# Patient Record
Sex: MALE | Race: WHITE
[De-identification: names, ages, dates, MRNs, and addresses within clinical notes are randomized per-mention and may not be internally consistent; named-entity substitution may affect disease eponyms.]

---

## 2018-12-29 ENCOUNTER — HOSPITAL ENCOUNTER (EMERGENCY)
Dept: HOSPITAL 53 - M ED | Age: 23
Discharge: HOME | End: 2018-12-29
Payer: COMMERCIAL

## 2018-12-29 VITALS — DIASTOLIC BLOOD PRESSURE: 88 MMHG | SYSTOLIC BLOOD PRESSURE: 122 MMHG

## 2018-12-29 DIAGNOSIS — R10.9: Primary | ICD-10-CM

## 2018-12-29 DIAGNOSIS — Z88.1: ICD-10-CM

## 2018-12-29 DIAGNOSIS — Z88.2: ICD-10-CM

## 2018-12-29 DIAGNOSIS — Z79.899: ICD-10-CM

## 2018-12-29 LAB
ALBUMIN SERPL BCG-MCNC: 4.5 GM/DL (ref 3.2–5.2)
ALT SERPL W P-5'-P-CCNC: 23 U/L (ref 12–78)
BASOPHILS # BLD AUTO: 0 10^3/UL (ref 0–0.2)
BASOPHILS NFR BLD AUTO: 0.4 % (ref 0–1)
BILIRUB CONJ SERPL-MCNC: 0.3 MG/DL (ref 0–0.2)
BILIRUB SERPL-MCNC: 1.2 MG/DL (ref 0.2–1)
BUN SERPL-MCNC: 17 MG/DL (ref 7–18)
CALCIUM SERPL-MCNC: 9.6 MG/DL (ref 8.5–10.1)
CHLORIDE SERPL-SCNC: 103 MEQ/L (ref 98–107)
CO2 SERPL-SCNC: 27 MEQ/L (ref 21–32)
CREAT SERPL-MCNC: 0.98 MG/DL (ref 0.7–1.3)
EOSINOPHIL # BLD AUTO: 0.2 10^3/UL (ref 0–0.5)
EOSINOPHIL NFR BLD AUTO: 2.2 % (ref 0–3)
GFR SERPL CREATININE-BSD FRML MDRD: > 60 ML/MIN/{1.73_M2} (ref 60–?)
GLUCOSE SERPL-MCNC: 103 MG/DL (ref 70–100)
HCT VFR BLD AUTO: 48.2 % (ref 42–52)
HGB BLD-MCNC: 16.8 G/DL (ref 13.5–17.5)
LIPASE SERPL-CCNC: 138 U/L (ref 73–393)
LYMPHOCYTES # BLD AUTO: 0.9 10^3/UL (ref 1.5–6.5)
LYMPHOCYTES NFR BLD AUTO: 11 % (ref 24–44)
MCH RBC QN AUTO: 29.5 PG (ref 27–33)
MCHC RBC AUTO-ENTMCNC: 34.9 G/DL (ref 32–36.5)
MCV RBC AUTO: 84.7 FL (ref 80–96)
MONOCYTES # BLD AUTO: 0.6 10^3/UL (ref 0–0.8)
MONOCYTES NFR BLD AUTO: 7.4 % (ref 0–5)
NEUTROPHILS # BLD AUTO: 6.1 10^3/UL (ref 1.8–7.7)
NEUTROPHILS NFR BLD AUTO: 78.6 % (ref 36–66)
PLATELET # BLD AUTO: 196 10^3/UL (ref 150–450)
POTASSIUM SERPL-SCNC: 3.8 MEQ/L (ref 3.5–5.1)
PROT SERPL-MCNC: 8.5 GM/DL (ref 6.4–8.2)
RBC # BLD AUTO: 5.69 10^6/UL (ref 4.3–6.1)
SODIUM SERPL-SCNC: 138 MEQ/L (ref 136–145)
WBC # BLD AUTO: 7.8 10^3/UL (ref 4–10)

## 2018-12-29 PROCEDURE — 96375 TX/PRO/DX INJ NEW DRUG ADDON: CPT

## 2018-12-29 PROCEDURE — 80076 HEPATIC FUNCTION PANEL: CPT

## 2018-12-29 PROCEDURE — 85025 COMPLETE CBC W/AUTO DIFF WBC: CPT

## 2018-12-29 PROCEDURE — 99284 EMERGENCY DEPT VISIT MOD MDM: CPT

## 2018-12-29 PROCEDURE — 96361 HYDRATE IV INFUSION ADD-ON: CPT

## 2018-12-29 PROCEDURE — 36415 COLL VENOUS BLD VENIPUNCTURE: CPT

## 2018-12-29 PROCEDURE — 96374 THER/PROPH/DIAG INJ IV PUSH: CPT

## 2018-12-29 PROCEDURE — 80048 BASIC METABOLIC PNL TOTAL CA: CPT

## 2018-12-29 PROCEDURE — 76705 ECHO EXAM OF ABDOMEN: CPT

## 2018-12-29 PROCEDURE — 83690 ASSAY OF LIPASE: CPT

## 2018-12-29 NOTE — REP
Clinical: Acute abdominal pain.

 

Technique:  Gray scale ultrasound using curved array transducer.

 

Findings:  The liver and pancreas are normal in contour, size, and echogenicity

without focal hepatic or pancreatic lesions identified.  The gallbladder is

normal without gallstones, wall thickening or pericholecystic fluid.  No biliary

ductal dilatation is appreciated, and the common bile duct measures 03/1949 the

mm diameter.  The right kidney is normal in reniform shape without hydronephrosis

and measures 10.5 x 4.7 x 3.6 cm.  No ascites. Visualized portions of the

abdominal aorta normal.

 

Impression:

Normal right upper quadrant and gallbladder abdominal ultrasound.

 

 

Electronically Signed by

Hollis Rodriguez MD 12/29/2018 08:23 A

## 2018-12-31 ENCOUNTER — HOSPITAL ENCOUNTER (OUTPATIENT)
Dept: HOSPITAL 53 - M LAB REF | Age: 23
End: 2018-12-31
Attending: PHYSICIAN ASSISTANT
Payer: COMMERCIAL

## 2018-12-31 DIAGNOSIS — K29.00: Primary | ICD-10-CM

## 2019-04-24 ENCOUNTER — HOSPITAL ENCOUNTER (EMERGENCY)
Dept: HOSPITAL 53 - M ED | Age: 24
LOS: 1 days | Discharge: HOME | End: 2019-04-25
Payer: COMMERCIAL

## 2019-04-24 VITALS — BODY MASS INDEX: 20.34 KG/M2 | WEIGHT: 145.31 LBS | HEIGHT: 71 IN

## 2019-04-24 VITALS — SYSTOLIC BLOOD PRESSURE: 135 MMHG | DIASTOLIC BLOOD PRESSURE: 72 MMHG
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
## 2019-04-24 DIAGNOSIS — S00.83XA: Primary | ICD-10-CM
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
## 2019-04-24 DIAGNOSIS — Z88.8: ICD-10-CM

## 2019-04-24 DIAGNOSIS — Z79.899: ICD-10-CM
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
## 2019-04-24 DIAGNOSIS — Z88.2: ICD-10-CM
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
